# Patient Record
Sex: FEMALE | Race: WHITE | NOT HISPANIC OR LATINO | Employment: STUDENT | URBAN - NONMETROPOLITAN AREA
[De-identification: names, ages, dates, MRNs, and addresses within clinical notes are randomized per-mention and may not be internally consistent; named-entity substitution may affect disease eponyms.]

---
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
## 2024-02-14 ENCOUNTER — TELEPHONE (OUTPATIENT)
Dept: PRIMARY CARE | Facility: CLINIC | Age: 19
End: 2024-02-14
Payer: COMMERCIAL

## 2024-02-14 NOTE — TELEPHONE ENCOUNTER
I agree, she could try to call her endocrinologist and try to do a virtual appointment I can see her the following Saturday but I do not want her to go another 1  1/2 weeks with these symptoms without talking or seeing a doctor

## 2024-02-14 NOTE — TELEPHONE ENCOUNTER
Mom calling said pt is away at school in [place] and has been having headaches for about 3 weeks and now and insomnia with some blurred vision at times reading her books. She is taking Advil but not helping and is actually causing some burning in her throat. Mom wanted to know if this could be possibility due to her thyroid? Should she come home on Sat and have thyroid checked? She was trying to have her seen on   This Sat but you are not this Sat. Please advise?

## 2024-02-15 NOTE — PROGRESS NOTES
Subjective        Mary Mancini is a 18 y.o. female who presents for      HPI:    Dr Trejo pt       Chief Complaint   Patient presents with    Headache   Insomnia, history thyroid       See phone message - yesterday-     History thyroid- mother concerned thyroid causeing problems and wants checked     Last TSH - 7/2023 - was normal     Pt is here with mother     What concern/ problem/pain/symptom  brings you here today?  Pt states she is not sure if she is having issues with her thyroid but she has been having a lot of headaches and unable to sleep       how long has pt had sxs?  Sx x a few months -    describe symptoms-  Nausea- n   Vomiting- n  Loss of vision- blurred vision once in awhile  Last eye exam? [place] [facility]   Wears contacts? no  Recent(last 2 weeks) head injury- none           how often do symptoms occur?  Daily     has pt tried anything for current symptoms, including medications (OTC or prescription) ?  Advil 1-2 times but does not help       what makes symptoms worse?  Light/sound/ computer    has pt been seen recently for this problem ( within past 2-3 weeks) ?  No   if yes- where?    by who? na    what treatment was provided? na      What year is pt in college? Freshman - [employer]- studying biology    Pt always stressed     Pt unable to sleep    In dorm , roommate    Does not get along with roommate, different [id number] and habits - roommate parties and will leave lights on          Any recent stresses?  [place]     Has Endocrinologist [facility]     No vomiting. No neuro symptoms       Social History     Tobacco Use   Smoking Status Never    Passive exposure: Never   Smokeless Tobacco Never         Review of Systems:        Objective        Vitals:    02/17/24 0957   BP: 111/74   Pulse: 80   Temp: 36.7 °C (98 °F)   SpO2: 98%   Weight: 51.4 kg (113 lb 4.8 oz)         Patient is alert and oriented x 3 , NAD  HEAD- normocephalic and atraumatic   EYES-conjunctiva-normal, lids -  normal  EARS/NOSE- normal external exam   NECK-supple,FROM  CV- RRR without murmur  PULM- CTA bilaterally, normal respiratory effort  RESPIRATORY EFFORT- normal , no retractions or nasal flaring   ABD- normoactive BS's   EXT- no edema,NT  SKIN- no abnormal skin lesions noted  NEURO- no focal deficits  PSYCH- pleasant, normal judgement and insight                BP Readings from Last 3 Encounters:   02/17/24 111/74   07/21/23 97/63   07/18/22 98/64 (12 %, Z = -1.17 /  51 %, Z = 0.03)*     *BP percentiles are based on the 2017 AAP Clinical Practice Guideline for girls           Wt Readings from Last 3 Encounters:   02/17/24 51.4 kg (113 lb 4.8 oz) (22 %, Z= -0.77)*   07/21/23 51.6 kg (113 lb 12.8 oz) (25 %, Z= -0.67)*   07/18/22 51.5 kg (29 %, Z= -0.55)*     * Growth percentiles are based on CDC (Girls, 2-20 Years) data.         BMI Readings from Last 3 Encounters:   02/17/24 20.39 kg/m² (34 %, Z= -0.41)*   07/21/23 20.48 kg/m² (37 %, Z= -0.34)*   07/18/22 20.76 kg/m² (45 %, Z= -0.13)*     * Growth percentiles are based on CDC (Girls, 2-20 Years) data.     The number and complexity of problems addressed is considered moderate.  The amount and/or complexity of data reviewed and analyzed is considered moderate. The risk of complications and/or morbidity/mortality of patient is considered moderate. Overall, this patient encounter is considered a moderate risk visit.      Consider trazodone if no better on melatonin       No further work up headache at this  time - no red flages - discussed increase hydration- only drinking 32 ounce water per day, sleep thru the night- minimize fatigue, decrease stress at school , just had eye exam and if headaches persist may need further work up at that time     Assessment/Plan      1. Acute nonintractable headache, unspecified headache type        2. Insomnia, unspecified type        3. Autoimmune thyroiditis        4. Hypothyroidism, unspecified type            No orders of the  defined types were placed in this encounter.      Start melatonin       Check TSH      Increase hydration- water 64 ounces per day      Follow up with PCP Dr Trejo- reevaluate headaches, insomnia , thyroid

## 2024-02-15 NOTE — TELEPHONE ENCOUNTER
Pt mom called-advised that she will take pt to [facility] for labs (pt has standing orders there).    PT mom asked to have the following text conversation between her and pt documented:  02/14/2024 20:30  Mom: What does it feel like? Is it a sharp pain?  Pt: Depends on the day-feels like someone is compressing my temples, forehead and back of my head. It makes my eyes really hurt.

## 2024-02-17 ENCOUNTER — OFFICE VISIT (OUTPATIENT)
Dept: PRIMARY CARE | Facility: CLINIC | Age: 19
End: 2024-02-17
Payer: COMMERCIAL

## 2024-02-17 VITALS
BODY MASS INDEX: 20.39 KG/M2 | WEIGHT: 113.3 LBS | HEART RATE: 80 BPM | OXYGEN SATURATION: 98 % | DIASTOLIC BLOOD PRESSURE: 74 MMHG | SYSTOLIC BLOOD PRESSURE: 111 MMHG | TEMPERATURE: 98 F

## 2024-02-17 DIAGNOSIS — E03.9 HYPOTHYROIDISM, UNSPECIFIED TYPE: ICD-10-CM

## 2024-02-17 DIAGNOSIS — E06.3 AUTOIMMUNE THYROIDITIS: ICD-10-CM

## 2024-02-17 DIAGNOSIS — R51.9 ACUTE NONINTRACTABLE HEADACHE, UNSPECIFIED HEADACHE TYPE: Primary | ICD-10-CM

## 2024-02-17 DIAGNOSIS — G47.00 INSOMNIA, UNSPECIFIED TYPE: ICD-10-CM

## 2024-02-17 PROCEDURE — 84439 ASSAY OF FREE THYROXINE: CPT

## 2024-02-17 PROCEDURE — 84443 ASSAY THYROID STIM HORMONE: CPT

## 2024-02-17 PROCEDURE — 1036F TOBACCO NON-USER: CPT | Performed by: FAMILY MEDICINE

## 2024-02-17 PROCEDURE — 99214 OFFICE O/P EST MOD 30 MIN: CPT | Performed by: FAMILY MEDICINE

## 2024-02-17 PROCEDURE — 36415 COLL VENOUS BLD VENIPUNCTURE: CPT

## 2024-02-18 LAB
T4 FREE SERPL-MCNC: 1.48 NG/DL (ref 0.78–1.48)
TSH SERPL-ACNC: 0.36 MIU/L (ref 0.44–3.98)

## 2024-05-31 ENCOUNTER — APPOINTMENT (OUTPATIENT)
Dept: PRIMARY CARE | Facility: CLINIC | Age: 19
End: 2024-05-31
Payer: COMMERCIAL

## 2024-07-23 ENCOUNTER — APPOINTMENT (OUTPATIENT)
Dept: PRIMARY CARE | Facility: CLINIC | Age: 19
End: 2024-07-23
Payer: COMMERCIAL

## 2024-07-23 VITALS
WEIGHT: 119.3 LBS | OXYGEN SATURATION: 98 % | DIASTOLIC BLOOD PRESSURE: 70 MMHG | HEIGHT: 63 IN | HEART RATE: 89 BPM | SYSTOLIC BLOOD PRESSURE: 104 MMHG | TEMPERATURE: 97.3 F | BODY MASS INDEX: 21.14 KG/M2

## 2024-07-23 DIAGNOSIS — E03.9 HYPOTHYROIDISM, UNSPECIFIED TYPE: ICD-10-CM

## 2024-07-23 DIAGNOSIS — Z00.00 WELLNESS EXAMINATION: Primary | ICD-10-CM

## 2024-07-23 PROCEDURE — 99395 PREV VISIT EST AGE 18-39: CPT | Performed by: FAMILY MEDICINE

## 2024-07-23 PROCEDURE — 3008F BODY MASS INDEX DOCD: CPT | Performed by: FAMILY MEDICINE

## 2024-07-23 PROCEDURE — 1036F TOBACCO NON-USER: CPT | Performed by: FAMILY MEDICINE

## 2024-07-23 ASSESSMENT — ENCOUNTER SYMPTOMS
RESPIRATORY NEGATIVE: 1
DIARRHEA: 0
VOMITING: 0
ALLERGIC/IMMUNOLOGIC NEGATIVE: 1
NAUSEA: 0
HEMATOLOGIC/LYMPHATIC NEGATIVE: 1
CARDIOVASCULAR NEGATIVE: 1
MUSCULOSKELETAL NEGATIVE: 1
GASTROINTESTINAL NEGATIVE: 1
CONSTITUTIONAL NEGATIVE: 1
NEUROLOGICAL NEGATIVE: 1
EYES NEGATIVE: 1
CHEST TIGHTNESS: 0
ENDOCRINE NEGATIVE: 1
SHORTNESS OF BREATH: 0
PSYCHIATRIC NEGATIVE: 1

## 2024-07-23 NOTE — PATIENT INSTRUCTIONS
1.  Well visit    Today in the office you had your annual wellness exam    Please have fasting labs at your convenience we will check kidney function liver function blood sugar cholesterol and vitamin D blood counts making sure you are not anemic we will call you or send you a message in regards to these results    Continue following up with your endocrinologist as I know you will for treatment of your Graves' disease continue on your thyroid medication    Continue eating a healthy diet a good goal is 5-7 servings of fresh fruit and vegetable every day along with lean protein avoid simple sugars avoid fast foods    Keep exercising 30 to 60 minutes 5 days a week is ideal    Continue seeing your dentist on an annual basis or twice a year.    If all your labs returned normal and you otherwise remain healthy I would like to see you back in 1 year for an annual wellness visit I am happy to see you sooner if needed

## 2024-07-23 NOTE — PROGRESS NOTES
"Subjective   Patient ID: Mary Mancini is a 18 y.o. female who presents for Annual Exam (CPE).    HPI     The patient has no concerns today in office.    The patient is not currently sexually active.    The patient denies any changes in vision, hearing or dental.     The patient maintains they do not have any chest pain, chest tightness or shortness of breath.    They do not experience nausea, emesis, changes in bowel movements or dyspepsia.    The patient's vaccinations are up to date.      Review of Systems   Constitutional: Negative.    HENT: Negative.  Negative for dental problem and hearing loss.    Eyes: Negative.  Negative for visual disturbance.   Respiratory: Negative.  Negative for chest tightness and shortness of breath.    Cardiovascular: Negative.  Negative for chest pain.   Gastrointestinal: Negative.  Negative for diarrhea, nausea and vomiting.   Endocrine: Negative.    Genitourinary: Negative.    Musculoskeletal: Negative.    Skin: Negative.    Allergic/Immunologic: Negative.    Neurological: Negative.    Hematological: Negative.    Psychiatric/Behavioral: Negative.         Objective   /70 (BP Location: Left arm, Patient Position: Sitting, BP Cuff Size: Adult)   Pulse 89   Temp 36.3 °C (97.3 °F) (Temporal)   Ht 1.6 m (5' 3\")   Wt 54.1 kg (119 lb 4.8 oz)   LMP 07/10/2024   SpO2 98%   BMI 21.13 kg/m²     Physical Exam  Constitutional:       Appearance: Normal appearance.   HENT:      Head: Normocephalic and atraumatic.      Nose: Nose normal.   Eyes:      Extraocular Movements: Extraocular movements intact.      Conjunctiva/sclera: Conjunctivae normal.      Pupils: Pupils are equal, round, and reactive to light.   Cardiovascular:      Rate and Rhythm: Normal rate and regular rhythm.      Pulses: Normal pulses.      Heart sounds: Normal heart sounds.   Pulmonary:      Effort: Pulmonary effort is normal.      Breath sounds: Normal breath sounds.   Abdominal:      General: Bowel " sounds are normal.      Palpations: Abdomen is soft.   Genitourinary:     General: Normal vulva.      Rectum: Normal.   Musculoskeletal:         General: Normal range of motion.      Cervical back: Normal range of motion and neck supple.   Skin:     General: Skin is warm.   Neurological:      Mental Status: She is alert and oriented to person, place, and time.   Psychiatric:         Mood and Affect: Mood normal.         Behavior: Behavior normal.         Thought Content: Thought content normal.         Judgment: Judgment normal.         Assessment/Plan   Problem List Items Addressed This Visit             ICD-10-CM    Hypothyroid E03.9    Relevant Orders    TSH with reflex to Free T4 if abnormal     Other Visit Diagnoses         Codes    Wellness examination    -  Primary Z00.00    Relevant Orders    CBC and Auto Differential    Comprehensive Metabolic Panel    Lipid Panel    TSH with reflex to Free T4 if abnormal    Hemoglobin A1C    Vitamin D 25-Hydroxy,Total (for eval of Vitamin D levels)    Follow Up In [facility] - PCP - Health Maintenance               1. Wellness examination  Follow Up In [facility] - PCP    CBC and Auto Differential    Comprehensive Metabolic Panel    Lipid Panel    TSH with reflex to Free T4 if abnormal    Hemoglobin A1C    Vitamin D 25-Hydroxy,Total (for eval of Vitamin D levels)    Follow Up In [facility] - PCP - Health Maintenance      2. Hypothyroidism, unspecified type  TSH with reflex to Free T4 if abnormal        1.  Well visit    Today in the office you had your annual wellness exam    Please have fasting labs at your convenience we will check kidney function liver function blood sugar cholesterol and vitamin D blood counts making sure you are not anemic we will call you or send you a message in regards to these results    Continue following up with your endocrinologist as I know you will for treatment of your Graves' disease continue on your thyroid  medication    Continue eating a healthy diet a good goal is 5-7 servings of fresh fruit and vegetable every day along with lean protein avoid simple sugars avoid fast foods    Keep exercising 30 to 60 minutes 5 days a week is ideal    Continue seeing your dentist on an annual basis or twice a year.    If all your labs returned normal and you otherwise remain healthy I would like to see you back in 1 year for an annual wellness visit I am happy to see you sooner if needed    Please consider receiving a HPV vaccine to prevent cancer     Follow-up in 1 year or sooner if there are any concerns.    Scribe Attestation  By signing my name below, IRoslyn, Scribandrew   attest that this documentation has been prepared under the direction and in the presence of Lee Trejo MD.    This note has been transcribed using a medical scribe and there is a possibility of unintentional typing misprints.

## 2025-07-25 ENCOUNTER — APPOINTMENT (OUTPATIENT)
Dept: PRIMARY CARE | Facility: CLINIC | Age: 20
End: 2025-07-25
Payer: COMMERCIAL

## 2025-07-28 ENCOUNTER — APPOINTMENT (OUTPATIENT)
Dept: PRIMARY CARE | Facility: CLINIC | Age: 20
End: 2025-07-28
Payer: COMMERCIAL

## 2025-07-28 VITALS
BODY MASS INDEX: 20.57 KG/M2 | HEIGHT: 63 IN | SYSTOLIC BLOOD PRESSURE: 106 MMHG | OXYGEN SATURATION: 98 % | WEIGHT: 116.1 LBS | DIASTOLIC BLOOD PRESSURE: 65 MMHG | TEMPERATURE: 97.9 F | HEART RATE: 81 BPM

## 2025-07-28 DIAGNOSIS — E55.9 VITAMIN D DEFICIENCY: ICD-10-CM

## 2025-07-28 DIAGNOSIS — E06.3 AUTOIMMUNE THYROIDITIS: ICD-10-CM

## 2025-07-28 DIAGNOSIS — Z00.00 WELLNESS EXAMINATION: Primary | ICD-10-CM

## 2025-07-28 DIAGNOSIS — E89.0 POSTOPERATIVE HYPOTHYROIDISM: ICD-10-CM

## 2025-07-28 PROCEDURE — 1036F TOBACCO NON-USER: CPT | Performed by: FAMILY MEDICINE

## 2025-07-28 PROCEDURE — 3008F BODY MASS INDEX DOCD: CPT | Performed by: FAMILY MEDICINE

## 2025-07-28 PROCEDURE — 99395 PREV VISIT EST AGE 18-39: CPT | Performed by: FAMILY MEDICINE

## 2025-07-28 ASSESSMENT — PATIENT HEALTH QUESTIONNAIRE - PHQ9
1. LITTLE INTEREST OR PLEASURE IN DOING THINGS: NOT AT ALL
2. FEELING DOWN, DEPRESSED OR HOPELESS: NOT AT ALL
SUM OF ALL RESPONSES TO PHQ9 QUESTIONS 1 AND 2: 0

## 2025-07-28 NOTE — PATIENT INSTRUCTIONS
VISIT SUMMARY:  Today, you had a wellness exam where we discussed your overall health and reviewed your current treatments and lifestyle habits. You reported feeling generally well, and we went over your management plan for hypothyroidism and your history of peptic ulcers. We also talked about general health maintenance and preventive measures.    YOUR PLAN:  -HYPOTHYROIDISM: Hypothyroidism is a condition where your thyroid gland does not produce enough thyroid hormone. You are currently managing this with 100 micrograms of levothyroxine daily. There are no symptoms indicating the need for a dosage adjustment at this time. You should continue taking your medication as prescribed and coordinate with your endocrinologist for repeat thyroid function tests in four months.    -PEPTIC ULCER DISEASE: Peptic ulcer disease involves sores that develop on the lining of your stomach. Your previous ulcers have resolved, and there has been no recurrence in the past two years. There is no confirmed diagnosis of Behcet's disease.    -GENERAL HEALTH MAINTENANCE: You are a non-smoker, and no  alcohol, and are not currently sexually active. We discussed the potential benefits of the HPV vaccination, which can reduce the risk of certain cancers. You should continue maintaining a heart-healthy diet rich in fruits, vegetables, and lean proteins, engage in regular physical activity for at least 30 minutes five days a week, and avoid simple sugars and fast foods.    INSTRUCTIONS:  Please continue taking your levothyroxine as prescribed and coordinate with your endocrinologist for repeat thyroid function tests in four months. Consider getting the HPV vaccination. Maintain your heart-healthy diet and regular physical activity. If you have any new symptoms or concerns, please schedule a follow-up appointment.

## 2025-07-28 NOTE — PROGRESS NOTES
"Subjective   Patient ID: Mary Mancini is a 19 y.o. female who presents for Annual Exam (CPE).  History of Present Illness  Mary Mancini is a 19 year old female who presents for a wellness exam.    She reports her overall health as good and has been recently evaluated by her endocrinologist for hypothyroidism. She is currently on 100 micrograms of thyroid medication daily.    She has a history of an ulcer two years ago, which has since healed. She occasionally experiences ulcers every few years, but there has been no diagnosis of Behcet's disease.    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 She is a nonsmoker, does not consume alcohol, and is not currently sexually active. She plans to use condoms if she becomes sexually active.    No chest pain, heart palpitations, breathing difficulties, or use of inhalers. No stomach issues, heartburn, or abnormal bowel movements. Menstrual cycles are normal. No skin rashes, moles, or freckles of concern, and no feelings of heightened anxiety or depression. No arthritis, muscle, or joint pain.    Review of Systems   All other systems reviewed and are negative.      Objective     /65 (BP Location: Right arm, Patient Position: Sitting, BP Cuff Size: Adult)   Pulse 81   Temp 36.6 °C (97.9 °F) (Temporal)   Ht 1.6 m (5' 3\")   Wt 52.7 kg (116 lb 1.6 oz)   SpO2 98%   BMI 20.57 kg/m²      Physical Exam  GENERAL: Alert, cooperative, well developed, no acute distress.  HEENT: Normocephalic, normal oropharynx, moist mucous membranes, cerumen present in ears.  NECK: Neck examined, inferred normal.  CHEST: Clear to auscultation bilaterally, no wheezes, rhonchi, or crackles.  CARDIOVASCULAR: Normal heart rate and rhythm, S1 and S2 normal without murmurs.  ABDOMEN: Soft, non-tender, non-distended, without organomegaly, normal bowel sounds.  EXTREMITIES: No cyanosis or " edema.  NEUROLOGICAL: Cranial nerves grossly intact, moves all extremities without gross motor or sensory deficit.    Assessment & Plan  Hypothyroidism  Post-thyroidectomy, managed with 100 micrograms of levothyroxine. No symptoms indicating the need for adjustment. Thyroid function tests were last conducted a year ago. Under endocrinology care with planned repeat blood work in four months.  - Continue levothyroxine 100 micrograms daily.  - Coordinate with endocrinologist for repeat thyroid function tests in four months.        General Health Maintenance  Non-smoker, minimal alcohol consumption, not sexually active. Considering HPV vaccination, which reduces cancer risk, especially with prior virus exposure. Maintains a healthy lifestyle with regular exercise and a balanced diet.  - Consider HPV vaccination.  - Continue heart-healthy diet rich in fruits, vegetables, and lean proteins.  - Engage in regular physical activity, ideally 30 minutes five days a week.  - Avoid simple sugars and fast foods.    1. Wellness examination  Lipid Panel    Comprehensive Metabolic Panel    Vitamin D 25-Hydroxy,Total (for eval of Vitamin D levels)    CBC and Auto Differential    Lipid Panel    Comprehensive Metabolic Panel    Vitamin D 25-Hydroxy,Total (for eval of Vitamin D levels)    CBC and Auto Differential      2. Vitamin D deficiency  Lipid Panel    Comprehensive Metabolic Panel    Vitamin D 25-Hydroxy,Total (for eval of Vitamin D levels)    CBC and Auto Differential    Lipid Panel    Comprehensive Metabolic Panel    Vitamin D 25-Hydroxy,Total (for eval of Vitamin D levels)    CBC and Auto Differential      3. Postoperative hypothyroidism  Lipid Panel    Comprehensive Metabolic Panel    Vitamin D 25-Hydroxy,Total (for eval of Vitamin D levels)    CBC and Auto Differential    Lipid Panel    Comprehensive Metabolic Panel    Vitamin D 25-Hydroxy,Total (for eval of Vitamin D levels)    CBC and Auto Differential      4. Autoimmune  thyroiditis           Lee Trejo MD     This medical note was created with the assistance of artificial intelligence (AI) for documentation purposes. The content has been reviewed and confirmed by the healthcare provider for accuracy and completeness. Patient consented to the use of audio recording and use of AI during their visit.

## 2026-07-31 ENCOUNTER — APPOINTMENT (OUTPATIENT)
Dept: PRIMARY CARE | Facility: CLINIC | Age: 21
End: 2026-07-31
Payer: COMMERCIAL